# Patient Record
Sex: MALE | Race: WHITE | NOT HISPANIC OR LATINO | URBAN - METROPOLITAN AREA
[De-identification: names, ages, dates, MRNs, and addresses within clinical notes are randomized per-mention and may not be internally consistent; named-entity substitution may affect disease eponyms.]

---

## 2023-09-19 ENCOUNTER — HOSPITAL ENCOUNTER (OUTPATIENT)
Dept: DATA CONVERSION | Facility: HOSPITAL | Age: 58
Discharge: HOME | End: 2023-09-19
Payer: MEDICARE

## 2023-09-19 DIAGNOSIS — E03.9 HYPOTHYROIDISM, UNSPECIFIED: ICD-10-CM

## 2023-09-19 LAB — TSH SERPL DL<=0.05 MIU/L-ACNC: 3.83 MIU/L (ref 0.27–4.2)

## 2024-03-13 ENCOUNTER — APPOINTMENT (OUTPATIENT)
Dept: PRIMARY CARE | Facility: CLINIC | Age: 59
End: 2024-03-13
Payer: MEDICARE

## 2024-03-26 ENCOUNTER — TELEPHONE (OUTPATIENT)
Dept: PRIMARY CARE | Facility: CLINIC | Age: 59
End: 2024-03-26

## 2024-03-26 NOTE — TELEPHONE ENCOUNTER
Patient is has an upcoming appointment scheduled for 4/15/24 and patient and parent would like blood work done prior to appt to insure they can discussed lab results with  the day of appt please advise

## 2024-04-04 NOTE — TELEPHONE ENCOUNTER
Parent is calling again for you to order the labs that he needs before his appointment so that he can get the results at the appointment.  Please order the labs and we can let the parent know that he can get them before his tashi on 4/15/24.  She said he is bipolar and he gets very nervous if he has to wait for his results, that is why they want the blood work done before the appointment and he can get the results at that time.

## 2024-04-12 ENCOUNTER — LAB (OUTPATIENT)
Dept: LAB | Facility: LAB | Age: 59
End: 2024-04-12
Payer: MEDICARE

## 2024-04-12 DIAGNOSIS — Z12.5 SCREENING FOR PROSTATE CANCER: ICD-10-CM

## 2024-04-12 DIAGNOSIS — Z13.6 SCREENING FOR HEART DISEASE: ICD-10-CM

## 2024-04-12 DIAGNOSIS — R53.83 TIRED: ICD-10-CM

## 2024-04-12 DIAGNOSIS — Z13.89 SCREENING FOR NEPHROPATHY: ICD-10-CM

## 2024-04-12 DIAGNOSIS — Z00.00 PHYSICAL EXAM: ICD-10-CM

## 2024-04-12 LAB
ALBUMIN SERPL BCP-MCNC: 4.4 G/DL (ref 3.4–5)
ALP SERPL-CCNC: 56 U/L (ref 33–120)
ALT SERPL W P-5'-P-CCNC: 36 U/L (ref 10–52)
ANION GAP SERPL CALC-SCNC: 11 MMOL/L (ref 10–20)
AST SERPL W P-5'-P-CCNC: 26 U/L (ref 9–39)
BASOPHILS # BLD AUTO: 0.07 X10*3/UL (ref 0–0.1)
BASOPHILS NFR BLD AUTO: 1.1 %
BILIRUB SERPL-MCNC: 1 MG/DL (ref 0–1.2)
BUN SERPL-MCNC: 17 MG/DL (ref 6–23)
CALCIUM SERPL-MCNC: 9.3 MG/DL (ref 8.6–10.3)
CHLORIDE SERPL-SCNC: 100 MMOL/L (ref 98–107)
CHOLEST SERPL-MCNC: 126 MG/DL (ref 0–199)
CHOLESTEROL/HDL RATIO: 2.9
CO2 SERPL-SCNC: 30 MMOL/L (ref 21–32)
CREAT SERPL-MCNC: 1.1 MG/DL (ref 0.5–1.3)
EGFRCR SERPLBLD CKD-EPI 2021: 79 ML/MIN/1.73M*2
EOSINOPHIL # BLD AUTO: 0.35 X10*3/UL (ref 0–0.7)
EOSINOPHIL NFR BLD AUTO: 5.5 %
ERYTHROCYTE [DISTWIDTH] IN BLOOD BY AUTOMATED COUNT: 14 % (ref 11.5–14.5)
GLUCOSE SERPL-MCNC: 112 MG/DL (ref 74–99)
HCT VFR BLD AUTO: 46.2 % (ref 41–52)
HDLC SERPL-MCNC: 43.9 MG/DL
HGB BLD-MCNC: 15.5 G/DL (ref 13.5–17.5)
IMM GRANULOCYTES # BLD AUTO: 0.01 X10*3/UL (ref 0–0.7)
IMM GRANULOCYTES NFR BLD AUTO: 0.2 % (ref 0–0.9)
LDLC SERPL CALC-MCNC: 69 MG/DL
LYMPHOCYTES # BLD AUTO: 1.19 X10*3/UL (ref 1.2–4.8)
LYMPHOCYTES NFR BLD AUTO: 18.7 %
MCH RBC QN AUTO: 30.7 PG (ref 26–34)
MCHC RBC AUTO-ENTMCNC: 33.5 G/DL (ref 32–36)
MCV RBC AUTO: 92 FL (ref 80–100)
MONOCYTES # BLD AUTO: 0.51 X10*3/UL (ref 0.1–1)
MONOCYTES NFR BLD AUTO: 8 %
NEUTROPHILS # BLD AUTO: 4.22 X10*3/UL (ref 1.2–7.7)
NEUTROPHILS NFR BLD AUTO: 66.5 %
NON HDL CHOLESTEROL: 82 MG/DL (ref 0–149)
NRBC BLD-RTO: 0 /100 WBCS (ref 0–0)
PLATELET # BLD AUTO: 189 X10*3/UL (ref 150–450)
POTASSIUM SERPL-SCNC: 4.6 MMOL/L (ref 3.5–5.3)
PROT SERPL-MCNC: 6.7 G/DL (ref 6.4–8.2)
PSA SERPL-MCNC: 0.7 NG/ML
RBC # BLD AUTO: 5.05 X10*6/UL (ref 4.5–5.9)
RBC #/AREA URNS AUTO: NORMAL /HPF
SODIUM SERPL-SCNC: 136 MMOL/L (ref 136–145)
TRIGL SERPL-MCNC: 66 MG/DL (ref 0–149)
TSH SERPL-ACNC: 3.93 MIU/L (ref 0.44–3.98)
VIT B12 SERPL-MCNC: 418 PG/ML (ref 211–911)
VLDL: 13 MG/DL (ref 0–40)
WBC # BLD AUTO: 6.4 X10*3/UL (ref 4.4–11.3)
WBC #/AREA URNS AUTO: NORMAL /HPF

## 2024-04-12 PROCEDURE — G0103 PSA SCREENING: HCPCS

## 2024-04-12 PROCEDURE — 82607 VITAMIN B-12: CPT

## 2024-04-12 PROCEDURE — 36415 COLL VENOUS BLD VENIPUNCTURE: CPT

## 2024-04-15 ENCOUNTER — APPOINTMENT (OUTPATIENT)
Dept: PRIMARY CARE | Facility: CLINIC | Age: 59
End: 2024-04-15
Payer: MEDICARE

## 2024-04-18 DIAGNOSIS — E55.9 VITAMIN D DEFICIENCY: Primary | ICD-10-CM

## 2024-04-18 DIAGNOSIS — E03.9 ACQUIRED HYPOTHYROIDISM: ICD-10-CM

## 2024-04-18 RX ORDER — ERGOCALCIFEROL 1.25 MG/1
1 CAPSULE ORAL
COMMUNITY
Start: 2024-03-29 | End: 2024-04-18 | Stop reason: SDUPTHER

## 2024-04-18 RX ORDER — LEVOTHYROXINE SODIUM 137 UG/1
137 TABLET ORAL
COMMUNITY
End: 2024-04-18 | Stop reason: SDUPTHER

## 2024-04-18 NOTE — TELEPHONE ENCOUNTER
Patient had to reschedule his appt but did have his TSH lab done- see results and please send med to [facility] [place]

## 2024-04-21 RX ORDER — ERGOCALCIFEROL 1.25 MG/1
1 CAPSULE ORAL
Qty: 4 CAPSULE | Refills: 5 | Status: SHIPPED | OUTPATIENT
Start: 2024-04-21

## 2024-04-21 RX ORDER — LEVOTHYROXINE SODIUM 137 UG/1
137 TABLET ORAL
Qty: 90 TABLET | Refills: 3 | Status: SHIPPED | OUTPATIENT
Start: 2024-04-21 | End: 2024-05-22 | Stop reason: SDUPTHER

## 2024-04-22 ENCOUNTER — APPOINTMENT (OUTPATIENT)
Dept: PRIMARY CARE | Facility: CLINIC | Age: 59
End: 2024-04-22
Payer: MEDICARE

## 2024-05-22 ENCOUNTER — OFFICE VISIT (OUTPATIENT)
Dept: PRIMARY CARE | Facility: CLINIC | Age: 59
End: 2024-05-22
Payer: MEDICARE

## 2024-05-22 VITALS
BODY MASS INDEX: 25.94 KG/M2 | OXYGEN SATURATION: 97 % | TEMPERATURE: 98.1 F | DIASTOLIC BLOOD PRESSURE: 70 MMHG | SYSTOLIC BLOOD PRESSURE: 120 MMHG | WEIGHT: 213 LBS | HEART RATE: 70 BPM | HEIGHT: 76 IN

## 2024-05-22 DIAGNOSIS — E03.9 ACQUIRED HYPOTHYROIDISM: ICD-10-CM

## 2024-05-22 DIAGNOSIS — Z00.00 PHYSICAL EXAM: Primary | ICD-10-CM

## 2024-05-22 DIAGNOSIS — F41.9 ANXIETY: ICD-10-CM

## 2024-05-22 DIAGNOSIS — R53.83 TIRED: ICD-10-CM

## 2024-05-22 DIAGNOSIS — E55.9 VITAMIN D DEFICIENCY: ICD-10-CM

## 2024-05-22 DIAGNOSIS — Z12.5 SCREENING FOR PROSTATE CANCER: ICD-10-CM

## 2024-05-22 PROCEDURE — G0439 PPPS, SUBSEQ VISIT: HCPCS | Performed by: FAMILY MEDICINE

## 2024-05-22 PROCEDURE — 99215 OFFICE O/P EST HI 40 MIN: CPT | Performed by: FAMILY MEDICINE

## 2024-05-22 RX ORDER — LORAZEPAM 1 MG/1
1 TABLET ORAL
COMMUNITY
Start: 2024-01-08

## 2024-05-22 RX ORDER — PHENOL 1.4 %
1 AEROSOL, SPRAY (ML) MUCOUS MEMBRANE DAILY
COMMUNITY
Start: 2019-06-24

## 2024-05-22 RX ORDER — LURASIDONE HYDROCHLORIDE 60 MG/1
60 TABLET, FILM COATED ORAL
COMMUNITY
Start: 2024-01-08

## 2024-05-22 RX ORDER — CREATINE MONOHYDRATE 100 %
1 POWDER (GRAM) MISCELLANEOUS 2 TIMES DAILY
COMMUNITY

## 2024-05-22 RX ORDER — ASPIRIN 325 MG
1 TABLET, DELAYED RELEASE (ENTERIC COATED) ORAL
COMMUNITY
Start: 2019-06-24

## 2024-05-22 RX ORDER — LAMOTRIGINE 200 MG/1
200 TABLET ORAL 2 TIMES DAILY
COMMUNITY
Start: 2024-01-08

## 2024-05-22 RX ORDER — CITALOPRAM 20 MG/1
20 TABLET, FILM COATED ORAL DAILY
COMMUNITY
Start: 2016-09-29

## 2024-05-22 RX ORDER — CARIPRAZINE 1.5 MG/1
1.5 CAPSULE, GELATIN COATED ORAL DAILY
COMMUNITY
Start: 2024-05-09

## 2024-05-22 RX ORDER — LEVOTHYROXINE SODIUM 137 UG/1
137 TABLET ORAL
Qty: 90 TABLET | Refills: 3 | Status: SHIPPED | OUTPATIENT
Start: 2024-05-22 | End: 2024-06-07 | Stop reason: ALTCHOICE

## 2024-05-22 RX ORDER — METHYLPHENIDATE HYDROCHLORIDE 20 MG/1
20 TABLET ORAL 2 TIMES DAILY
COMMUNITY
Start: 2024-05-16

## 2024-05-22 RX ORDER — CALCIUM CARBONATE 600 MG
1 TABLET ORAL DAILY
COMMUNITY
Start: 2019-06-24

## 2024-05-22 ASSESSMENT — PATIENT HEALTH QUESTIONNAIRE - PHQ9
SUM OF ALL RESPONSES TO PHQ9 QUESTIONS 1 AND 2: 0
1. LITTLE INTEREST OR PLEASURE IN DOING THINGS: NOT AT ALL
2. FEELING DOWN, DEPRESSED OR HOPELESS: NOT AT ALL

## 2024-05-22 ASSESSMENT — ENCOUNTER SYMPTOMS
NAUSEA: 0
DIARRHEA: 0
DIZZINESS: 0
ENDOCRINE NEGATIVE: 1
OCCASIONAL FEELINGS OF UNSTEADINESS: 0
LOSS OF SENSATION IN FEET: 0
FEVER: 0
SHORTNESS OF BREATH: 0
DIFFICULTY URINATING: 0
DEPRESSION: 0

## 2024-05-22 ASSESSMENT — PAIN SCALES - GENERAL: PAINLEVEL: 0-NO PAIN

## 2024-05-22 NOTE — PROGRESS NOTES
"Subjective   Patient ID: Ha Momin is a 58 y.o. male who presents for Annual Exam.    Physical exam  Hypothyroidsm  Vit d def  Tired  Joint pain  Stable anxiety       Review of Systems   Constitutional:  Negative for fever.        Also see HPI   Eyes:  Negative for visual disturbance.   Respiratory:  Negative for shortness of breath.    Cardiovascular:  Negative for chest pain.   Gastrointestinal:  Negative for diarrhea and nausea.   Endocrine: Negative.    Genitourinary:  Negative for difficulty urinating.   Skin:  Negative for rash.   Neurological:  Negative for dizziness.        No focal deficits   Psychiatric/Behavioral:  Negative for suicidal ideas.    All other systems reviewed and are negative.      Objective   /70   Pulse 70   Temp 36.7 °C (98.1 °F)   Ht 1.93 m (6' 4\")   Wt 96.6 kg (213 lb)   SpO2 97%   BMI 25.93 kg/m²     Physical Exam  Vitals and nursing note reviewed.   Constitutional:       Appearance: Normal appearance.   HENT:      Head: Normocephalic and atraumatic.   Eyes:      Extraocular Movements: Extraocular movements intact.      Conjunctiva/sclera: Conjunctivae normal.   Cardiovascular:      Rate and Rhythm: Normal rate and regular rhythm.      Heart sounds: Normal heart sounds.   Pulmonary:      Effort: Pulmonary effort is normal.      Breath sounds: Normal breath sounds.      Comments: Lungs essentially CTA b/l  Abdominal:      General: There is no distension.      Palpations: Abdomen is soft. There is no mass.      Tenderness: There is no abdominal tenderness.   Musculoskeletal:      Right lower leg: No edema.      Left lower leg: No edema.   Skin:     Coloration: Skin is not jaundiced.      Findings: No rash.   Neurological:      General: No focal deficit present.      Mental Status: He is alert and oriented to person, place, and time.   Psychiatric:         Mood and Affect: Mood normal.         Behavior: Behavior normal.         Thought Content: Thought content normal. "         Judgment: Judgment normal.       Assessment/Plan   Diagnoses and all orders for this visit:  Physical exam  Acquired hypothyroidism  -     levothyroxine (Synthroid, Levoxyl) 137 mcg tablet; Take 1 tablet (137 mcg) by mouth once daily in the morning. Take before meals. TAKE ON AN EMPTY STOMACH  -     TSH with reflex to Free T4 if abnormal; Future  Vitamin D deficiency  Tired  Screening for prostate cancer  Anxiety

## 2024-06-06 ENCOUNTER — LAB (OUTPATIENT)
Dept: LAB | Facility: LAB | Age: 59
End: 2024-06-06
Payer: MEDICARE

## 2024-06-06 DIAGNOSIS — E03.9 ACQUIRED HYPOTHYROIDISM: ICD-10-CM

## 2024-06-06 LAB — TSH SERPL-ACNC: 3.93 MIU/L (ref 0.44–3.98)

## 2024-06-06 PROCEDURE — 36415 COLL VENOUS BLD VENIPUNCTURE: CPT

## 2024-06-07 DIAGNOSIS — E03.9 ACQUIRED HYPOTHYROIDISM: Primary | ICD-10-CM

## 2024-06-07 RX ORDER — LEVOTHYROXINE SODIUM 150 UG/1
150 TABLET ORAL DAILY
Qty: 30 TABLET | Refills: 11 | Status: SHIPPED | OUTPATIENT
Start: 2024-06-07 | End: 2025-06-07

## 2024-07-05 ENCOUNTER — LAB (OUTPATIENT)
Dept: LAB | Facility: LAB | Age: 59
End: 2024-07-05
Payer: MEDICARE

## 2024-07-05 DIAGNOSIS — E03.9 ACQUIRED HYPOTHYROIDISM: ICD-10-CM

## 2024-07-05 LAB — TSH SERPL-ACNC: 2.98 MIU/L (ref 0.44–3.98)

## 2024-07-05 PROCEDURE — 36415 COLL VENOUS BLD VENIPUNCTURE: CPT

## 2024-07-08 DIAGNOSIS — E03.9 ACQUIRED HYPOTHYROIDISM: Primary | ICD-10-CM

## 2024-07-08 DIAGNOSIS — E03.9 ACQUIRED HYPOTHYROIDISM: ICD-10-CM

## 2024-07-08 RX ORDER — LEVOTHYROXINE SODIUM 150 UG/1
150 TABLET ORAL DAILY
Qty: 30 TABLET | Refills: 11 | Status: SHIPPED | OUTPATIENT
Start: 2024-07-08 | End: 2024-07-08 | Stop reason: SDUPTHER

## 2024-07-13 RX ORDER — LEVOTHYROXINE SODIUM 150 UG/1
150 TABLET ORAL DAILY
Qty: 90 TABLET | Refills: 3 | Status: SHIPPED | OUTPATIENT
Start: 2024-07-13 | End: 2025-07-13

## 2024-09-16 ENCOUNTER — APPOINTMENT (OUTPATIENT)
Dept: PRIMARY CARE | Facility: CLINIC | Age: 59
End: 2024-09-16
Payer: MEDICARE

## 2024-11-22 ENCOUNTER — APPOINTMENT (OUTPATIENT)
Dept: PRIMARY CARE | Facility: CLINIC | Age: 59
End: 2024-11-22
Payer: MEDICARE

## 2024-11-29 ENCOUNTER — APPOINTMENT (OUTPATIENT)
Dept: PRIMARY CARE | Facility: CLINIC | Age: 59
End: 2024-11-29
Payer: MEDICARE

## 2025-03-04 ENCOUNTER — APPOINTMENT (OUTPATIENT)
Dept: PRIMARY CARE | Facility: CLINIC | Age: 60
End: 2025-03-04
Payer: MEDICARE

## 2025-03-04 VITALS
OXYGEN SATURATION: 97 % | HEART RATE: 71 BPM | WEIGHT: 214 LBS | HEIGHT: 77 IN | SYSTOLIC BLOOD PRESSURE: 126 MMHG | TEMPERATURE: 97.4 F | DIASTOLIC BLOOD PRESSURE: 80 MMHG | BODY MASS INDEX: 25.27 KG/M2

## 2025-03-04 DIAGNOSIS — Z00.00 PHYSICAL EXAM: ICD-10-CM

## 2025-03-04 DIAGNOSIS — Z12.5 SCREENING PSA (PROSTATE SPECIFIC ANTIGEN): ICD-10-CM

## 2025-03-04 DIAGNOSIS — E55.9 VITAMIN D DEFICIENCY: ICD-10-CM

## 2025-03-04 DIAGNOSIS — F31.62 BIPOLAR DISORDER, CURRENT EPISODE MIXED, MODERATE (MULTI): Primary | ICD-10-CM

## 2025-03-04 DIAGNOSIS — Z00.00 WELL ADULT EXAM: ICD-10-CM

## 2025-03-04 DIAGNOSIS — E03.9 ACQUIRED HYPOTHYROIDISM: ICD-10-CM

## 2025-03-04 DIAGNOSIS — F41.9 ANXIETY: ICD-10-CM

## 2025-03-04 PROCEDURE — 99396 PREV VISIT EST AGE 40-64: CPT | Performed by: FAMILY MEDICINE

## 2025-03-04 PROCEDURE — 3008F BODY MASS INDEX DOCD: CPT | Performed by: FAMILY MEDICINE

## 2025-03-04 PROCEDURE — 1036F TOBACCO NON-USER: CPT | Performed by: FAMILY MEDICINE

## 2025-03-04 PROCEDURE — 93000 ELECTROCARDIOGRAM COMPLETE: CPT | Performed by: FAMILY MEDICINE

## 2025-03-04 ASSESSMENT — ENCOUNTER SYMPTOMS
DEPRESSION: 0
OCCASIONAL FEELINGS OF UNSTEADINESS: 0
LOSS OF SENSATION IN FEET: 0

## 2025-03-04 ASSESSMENT — PAIN SCALES - GENERAL: PAINLEVEL_OUTOF10: 0-NO PAIN

## 2025-03-04 NOTE — PROGRESS NOTES
Subjective   Patient ID: Ha Momin is a 59 y.o. male who presents for Annual Exam (EKG-  2015/Cologuard 9/2023  negative discuss if colonoscopy is needed/Tdap booster 3/2023/Zoster  7/2023  & 10/2023//Sees psychiatrist for bipolar).    HPI new patient here for physical exam.  Patient has bipolar illness.  He sees a specialist.  He is lamotrigine 200 mg daily, Vraylar 1.5 mg daily.  Patient has anxiety.  He is on lorazepam 1 mg as needed.  Patient has vitamin D deficiency.  He had been on on 50,000 units weekly but no longer takes it.  Patient has hypothyroidism is on levothyroxine 150 mcg daily.  Patient has ADD.  He is on methylphenidate 20 mg twice daily.  Patient had a normal Cologuard in 2023.  Patient was immunized against influenza 9/24.  He has been immunized against coronavirus x 8.  He had a tetanus shot in 2013.  He completed the shingles immunization series in 2023.  Patient does not use tobacco.  Patient does not use alcohol    Review of Systems  Constitutional Symptoms:  He is negative for fever, night sweats, hot flashes, weight loss, Weight loss due to diet, weight gain due to diet, unexpected weight gain, loss of appetite, increased appetite, headaches, fatigue, abnormal activity level, Sleep Disturbance, Recent Illness.   Eyes:  He is negative for blindness, blind spots, loss and blurring of vision, double vision, swelling, redness, pruritus, eye pain, discharge, dryness of eyes.   Ear, Nose, Mouth, Throat:  He is negative for hearing loss, wearing hearing aids, tinnitus, ear pain, ear drainage, dizziness, allergies, nasal congestion, rhinorrhea, nasal obstruction, post nasal drip, nose bleeds, teeth problems, wearing dentures, mouth sores, gum disease, dysphagia, hoarseness, sore throat, tinnitus, sleep apnea.   Cardiovascular:  He is negative for chest pain/pressure, radiation of pain, palpitations, shortness of breath, dyspnea on exertion, orthopnea, syncope, diaphoresis, cyanosis,  "edema.   Respiratory:  He is negative for shortness of breath, dyspnea on exertion, coughing, sputum, hemoptysis, wheezing, snoring.   Breast:  He is negative for masses, nipple discharge, gynecomastia.   Gastrointestinal:  He is negative for anorexia, indigestion, increased belching, food intolerance, use of antacids, nausea, vomiting, hematemesis, jaundice, abdominal pain, change in bowel habits, diarrhea, constipation, abnormal stools, hematochezia, melena, blood in stool, increased flatus, hemorrhoids.   Male Genitourinary:  He is positive for Incontinence - (Urgency), Slowing of urinary stream. He is negative for erectile dysfunction, frequency, nocturia, hesitancy, dribbling, dysuria, hematuria, Swelling of penis, testicular pain or swelling, Hematospermia, urethral discharge.   Musculoskeletal:  He is negative for joint pain, joint swelling, joint warmth, joint redness, myalgias, cramps, weakness, stiffness, limitation of motion.   Integumentary:   He is negative for change in mole, itching, dryness, loss of hair, hirsutism.   Neurological:  He is negative for headache, speech difficulty, numbness, tingling, weakness, paralysis, tremors, dizziness, syncope, balance problems, memory loss, .   Psychiatric:  He is negative for depression, moodiness, anhedonia, change in sleep pattern, disturbing thoughts or feelings, change in libido, suicidal thoughts or attempts, anxiety, panic attacks, obsessive thoughts, compulsions, hyperactivity.   Endocrine:  He is negative for weight gain, heat or cold intolerance, excessive sweating, polydipsia.   Hematologic/Lymphatic:  He is negative for bruising, abnormal bleeding, bleeding gums, nose bleeds, swollen glands.  Objective   /80 (BP Location: Left arm, Patient Position: Sitting)   Pulse 71   Temp 36.3 °C (97.4 °F) (Temporal)   Ht 1.956 m (6' 5\")   Wt 97.1 kg (214 lb)   SpO2 97%   BMI 25.38 kg/m²     Physical Exam  General Appearance: Pt is in no acute " distress. He is well nourished, well developed. The patient is awake and alert and appears stated age.  Head:   Pt's hair pattern is normal for patients age and The scalp is normal . The skull is normocephalic, atraumatic. The face is unremarkable with no facial droop. Palpation of the head reveals no tenderness or masses.  Eyes: PERRLA, EOMI, no scleral icterus.  Normal position and alignment. Eyebrows are normal. Ophthalmoscopic exam of reveals sharp discs reveals no AV nicking or arterial narrowing and no hemorrhages or exudates .  Ears, Nose, Mouth, Throat:   EARS: External bilateral ears reveal normal helix, tragus and ear lobe.  Both canals are normal.  Tympanic membranes are pearly gray, normal landmarks, good light reflex. Hearing is normal to finger rub   NOSE: Nasal mucosa is normal with no polyps, ulcers or lesions in Septum has no deviation.   MOUTH: Lips are normal with no lesion. Oral mucosa is moist.  Hard and soft palates are normal. Teeth are in good repair. Tongue reveals is normal. Tonsils are present with no lesions. Uvula is normal. Posterior pharynx without lesions.  Neck: Inspection of the neck reveals no masses or JVD.   Inspection reveals normal thyroid gland. Palpation shows normal thyroid gland. with No carotid bruit present.   Anterior cervical lymph node chains are unremarkable. Posterior chains are unremarkable.  Chest: Chest is symmetric. Lungs are clear to auscultation and percussion, no respiratory distress. Breathing is normal.  Cardiovascular: Heart is RRR, normal S1, S2. No murmurs, rubs or gallops. PMI is normal in left 6th IC space midclavicular line. Femoral pulses are present and without bruits. DP pulses are present. Extremities: no edema, clubbing, cyanosis, or varicosities.  Breast: INSPECTION: Size and symmetry: Breasts are normal and symmetric .  Abdomen: Abdomen is soft, NT, ND. BS + 4 quadrants. No organomegaly or masses..   Genitourinary: Genital exam: Penis is  circumcised without lesion or discharge.  Scrotum is normal.  There are no hernias palpable.  Testes are smooth and normal in size.   Lymph Nodes: Palpation of the cervical area are within normal limit. Palpation of the axillary area are within normal limit. Palpation of the inguinal area are within normal limit.  Musculoskeletal: Gait is normal. Extremities: Full Range of motion and strength throughout.  Skin: No obvious lesions.  Neurological: Intact and non-focal. Cranial nerves II - XII are grossly intact. Motor exams reveals normal tone and strength , Sensation: normal to touch, position and vibration. DTR: are +2/4 and symmetric at the AJ and KJ and no Babinski.  Psychiatric: Proper orientation to person, place and time. Recent memory is intact. Remote memory is intact. Patient's mood is normal with good eye contact.  Patient has a hyper affect  Assessment/Plan   Problem List Items Addressed This Visit             ICD-10-CM    Acquired hypothyroidism needs workup.  Will get a TSH in the near future. E03.9    Relevant Orders    CBC and Auto Differential    Comprehensive Metabolic Panel    Lipid Panel    TSH with reflex to Free T4 if abnormal    Vitamin D deficiency needs workup.  Get a vitamin D level in near future. E55.9    Relevant Orders    Vitamin D 25-Hydroxy,Total (for eval of Vitamin D levels)    Anxiety chronic, intermittent. F41.9     Other Visit Diagnoses         Codes    Bipolar disorder, current episode mixed, moderate (Multi)    -  Primary chronic.  Patient sees a specialist. F31.62    Physical exam    normal exam. Z00.00    Relevant Orders    ECG 12 Lead (Completed)    CBC and Auto Differential    Comprehensive Metabolic Panel    Lipid Panel    TSH with reflex to Free T4 if abnormal    Screening PSA (prostate specific antigen)    needs workup.  PSA in the near future Z12.5    Relevant Orders    Prostate Specific Antigen, Screen    Well adult exam     Z00.00

## 2025-03-06 LAB
25(OH)D3+25(OH)D2 SERPL-MCNC: 83 NG/ML (ref 30–100)
ALBUMIN SERPL-MCNC: 4.3 G/DL (ref 3.6–5.1)
ALP SERPL-CCNC: 65 U/L (ref 35–144)
ALT SERPL-CCNC: 28 U/L (ref 9–46)
ANION GAP SERPL CALCULATED.4IONS-SCNC: 8 MMOL/L (CALC) (ref 7–17)
AST SERPL-CCNC: 25 U/L (ref 10–35)
BASOPHILS # BLD AUTO: 92 CELLS/UL (ref 0–200)
BASOPHILS NFR BLD AUTO: 1.4 %
BILIRUB SERPL-MCNC: 1.1 MG/DL (ref 0.2–1.2)
BUN SERPL-MCNC: 16 MG/DL (ref 7–25)
CALCIUM SERPL-MCNC: 9.2 MG/DL (ref 8.6–10.3)
CHLORIDE SERPL-SCNC: 102 MMOL/L (ref 98–110)
CHOLEST SERPL-MCNC: 124 MG/DL
CHOLEST/HDLC SERPL: 3 (CALC)
CO2 SERPL-SCNC: 27 MMOL/L (ref 20–32)
CREAT SERPL-MCNC: 1.06 MG/DL (ref 0.7–1.3)
EGFRCR SERPLBLD CKD-EPI 2021: 82 ML/MIN/1.73M2
EOSINOPHIL # BLD AUTO: 389 CELLS/UL (ref 15–500)
EOSINOPHIL NFR BLD AUTO: 5.9 %
ERYTHROCYTE [DISTWIDTH] IN BLOOD BY AUTOMATED COUNT: 13 % (ref 11–15)
GLUCOSE SERPL-MCNC: 110 MG/DL (ref 65–99)
HCT VFR BLD AUTO: 46.9 % (ref 38.5–50)
HDLC SERPL-MCNC: 42 MG/DL
HGB BLD-MCNC: 15.7 G/DL (ref 13.2–17.1)
LDLC SERPL CALC-MCNC: 69 MG/DL (CALC)
LYMPHOCYTES # BLD AUTO: 1036 CELLS/UL (ref 850–3900)
LYMPHOCYTES NFR BLD AUTO: 15.7 %
MCH RBC QN AUTO: 30.3 PG (ref 27–33)
MCHC RBC AUTO-ENTMCNC: 33.5 G/DL (ref 32–36)
MCV RBC AUTO: 90.5 FL (ref 80–100)
MONOCYTES # BLD AUTO: 554 CELLS/UL (ref 200–950)
MONOCYTES NFR BLD AUTO: 8.4 %
NEUTROPHILS # BLD AUTO: 4528 CELLS/UL (ref 1500–7800)
NEUTROPHILS NFR BLD AUTO: 68.6 %
NONHDLC SERPL-MCNC: 82 MG/DL (CALC)
PLATELET # BLD AUTO: 189 THOUSAND/UL (ref 140–400)
PMV BLD REES-ECKER: 10.3 FL (ref 7.5–12.5)
POTASSIUM SERPL-SCNC: 4.1 MMOL/L (ref 3.5–5.3)
PROT SERPL-MCNC: 6.8 G/DL (ref 6.1–8.1)
PSA SERPL-MCNC: 0.84 NG/ML
RBC # BLD AUTO: 5.18 MILLION/UL (ref 4.2–5.8)
SODIUM SERPL-SCNC: 137 MMOL/L (ref 135–146)
TRIGL SERPL-MCNC: 45 MG/DL
TSH SERPL-ACNC: 2.92 MIU/L (ref 0.4–4.5)
WBC # BLD AUTO: 6.6 THOUSAND/UL (ref 3.8–10.8)

## 2025-03-10 ENCOUNTER — TELEPHONE (OUTPATIENT)
Dept: PRIMARY CARE | Facility: CLINIC | Age: 60
End: 2025-03-10
Payer: MEDICARE

## 2025-07-24 ENCOUNTER — TELEPHONE (OUTPATIENT)
Dept: PRIMARY CARE | Facility: CLINIC | Age: 60
End: 2025-07-24
Payer: MEDICARE

## 2025-07-24 DIAGNOSIS — E03.9 ACQUIRED HYPOTHYROIDISM: ICD-10-CM

## 2025-07-24 RX ORDER — LEVOTHYROXINE SODIUM 150 UG/1
150 TABLET ORAL DAILY
Qty: 90 TABLET | Refills: 3 | Status: SHIPPED | OUTPATIENT
Start: 2025-07-24 | End: 2026-07-24

## 2025-07-24 NOTE — TELEPHONE ENCOUNTER
Rx Refill Request Telephone Encounter    Name:  Ha Briscoeoscar  :  [id number]  Medication Name:  Levothyroxine, will be out Monday            Specific Pharmacy location:  [facility]  Date of last appointment:  3/4/25  Date of next appointment:  n/a will call and schedule appt next week  Best number to reach patient:

## 2025-07-25 RX ORDER — LEVOTHYROXINE SODIUM 150 UG/1
TABLET ORAL
Qty: 90 TABLET | Refills: 3 | OUTPATIENT
Start: 2025-07-25